# Patient Record
Sex: FEMALE | Race: WHITE | NOT HISPANIC OR LATINO | URBAN - METROPOLITAN AREA
[De-identification: names, ages, dates, MRNs, and addresses within clinical notes are randomized per-mention and may not be internally consistent; named-entity substitution may affect disease eponyms.]

---

## 2019-05-10 ENCOUNTER — EMERGENCY (EMERGENCY)
Facility: HOSPITAL | Age: 24
LOS: 1 days | Discharge: HOME | End: 2019-05-10
Admitting: PHYSICIAN ASSISTANT
Payer: COMMERCIAL

## 2019-05-10 VITALS
OXYGEN SATURATION: 100 % | RESPIRATION RATE: 16 BRPM | TEMPERATURE: 99 F | HEIGHT: 64 IN | WEIGHT: 139.99 LBS | DIASTOLIC BLOOD PRESSURE: 85 MMHG | SYSTOLIC BLOOD PRESSURE: 128 MMHG | HEART RATE: 105 BPM

## 2019-05-10 VITALS — RESPIRATION RATE: 18 BRPM | HEART RATE: 74 BPM | OXYGEN SATURATION: 100 %

## 2019-05-10 DIAGNOSIS — S61.211A LACERATION WITHOUT FOREIGN BODY OF LEFT INDEX FINGER WITHOUT DAMAGE TO NAIL, INITIAL ENCOUNTER: ICD-10-CM
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
## 2019-05-10 DIAGNOSIS — Z91.048 OTHER NONMEDICINAL SUBSTANCE ALLERGY STATUS: ICD-10-CM
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
## 2019-05-10 DIAGNOSIS — Z91.018 ALLERGY TO OTHER FOODS: ICD-10-CM
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
## 2019-05-10 PROCEDURE — 12001 RPR S/N/AX/GEN/TRNK 2.5CM/<: CPT

## 2019-05-10 PROCEDURE — 99282 EMERGENCY DEPT VISIT SF MDM: CPT | Mod: 25

## 2019-05-10 RX ORDER — ACETAMINOPHEN 500 MG
650 TABLET ORAL ONCE
Refills: 0 | Status: COMPLETED | OUTPATIENT
Start: 2019-05-10 | End: 2019-05-10

## 2019-05-10 RX ADMIN — Medication 650 MILLIGRAM(S): at 13:09

## 2019-05-10 NOTE — ED PROVIDER NOTE - NSFOLLOWUPINSTRUCTIONS_ED_ALL_ED_FT
Sutures to be removed in 10-14 days.  ________________________________________________________________________    Sutured Wound Care  Sutures are stitches that can be used to close wounds. Taking care of your wound properly can help to prevent pain and infection. It can also help your wound to heal more quickly. Follow instructions from your health care provider about how to care for your sutured wound.    Supplies needed:  Soap and water.  A clean bandage (dressing), if needed.  Antibiotic ointment.  A clean towel.  How to care for your sutured wound  Image   Keep the wound completely dry for the first 24 hours, or for as long as directed by your health care provider. After 24–48 hours, you may shower or bathe as directed by your health care provider. Do not soak or submerge the wound in water until the sutures have been removed.  After the first 24 hours, clean the wound once a day, or as often as directed by your health care provider, using the following steps:  Wash the wound with soap and water.  Rinse the wound with water to remove all soap.  Pat the wound dry with a clean towel. Do not rub the wound.  After cleaning the wound, apply a thin layer of antibiotic ointment as directed by your health care provider. This will prevent infection and keep the dressing from sticking to the wound.  Follow instructions from your health care provider about how to change your dressing:  Wash your hands with soap and water. If soap and water are not available, use hand .  Change your dressing at least once a day, or as often as told by your health care provider. If your dressing gets wet or dirty, change it.  Leave sutures and other skin closures, such as adhesive tape or skin glue, in place. These skin closures may need to stay in place for 2 weeks or longer. If adhesive strip edges start to loosen and curl up, you may trim the loose edges. Do not remove adhesive strips completely unless your health care provider tells you to do that.  Check your wound every day for signs of infection. Watch for:  Redness, swelling, or pain.  Fluid or blood.  Warmth.  Pus or a bad smell.  Have the sutures removed as directed by your health care provider.  Follow these instructions at home:  Medicines     Take or apply over-the-counter and prescription medicines only as told by your health care provider.  If you were prescribed an antibiotic medicine or ointment, take or apply it as told by your health care provider. Do not stop using the antibiotic even if your condition improves.  General instructions     To help reduce scarring after your wound heals, cover your wound with clothing or apply sunscreen of at least 30 SPF whenever you are outside.  Do not scratch or pick at your wound.  Avoid stretching your wound.  Raise (elevate) the injured area above the level of your heart while you are sitting or lying down, if possible.  Drink enough fluids to keep your urine clear or pale yellow.  Keep all follow-up visits as told by your health care provider. This is important.  Contact a health care provider if:  You received a tetanus shot and you have swelling, severe pain, redness, or bleeding at the injection site.  Your wound breaks open.  You have redness, swelling, or pain around your wound.  You have fluid or blood coming from your wound.  Your wound feels warm to the touch.  You have a fever.  You notice something coming out of your wound, such as wood or glass.  You have pain that does not get better with medicine.  The skin near your wound changes color.  You need to change your dressing very frequently due to a lot of fluid, blood, or pus draining from the wound.  You develop a new rash.  You develop numbness around the wound.  Get help right away if:  You develop severe swelling around your wound.  You have pus or a bad smell coming from your wound.  Your pain suddenly gets worse and is severe.  You develop painful lumps near your wound or anywhere on your body.  You have a red streak going away from your wound.  The wound is on your hand or foot and:  You cannot properly move a finger or toe.  Your fingers or toes look pale or bluish.  You have numbness that is spreading down your hand, foot, fingers, or toes.  Summary  Sutures are stitches that can be used to close wounds.  Taking care of your wound properly can help to prevent pain and infection.  Keep the wound completely dry for the first 24 hours, or for as long as directed by your health care provider. After 24–48 hours, you may shower or bathe as directed by your health care provider.  This information is not intended to replace advice given to you by your health care provider. Make sure you discuss any questions you have with your health care provider.

## 2019-05-10 NOTE — ED PROVIDER NOTE - OBJECTIVE STATEMENT
CARDIOVASCULAR - ADULT    • Maintains optimal cardiac output and hemodynamic stability Progressing    • Absence of cardiac arrhythmias or at baseline Progressing        Patient/Family Goals    • Patient/Family Long Term Goal Progressing    • Patient/Family
Problem: RESPIRATORY - ADULT  Goal: Achieves optimal ventilation and oxygenation  INTERVENTIONS:  - Assess for changes in respiratory status  - Assess for changes in mentation and behavior  - Position to facilitate oxygenation and minimize respiratory effo
Healthy 25 yo RHD fem c/o left index finger laceration.  Occurred while slicing bread 11 am today with clean sharp knife.  Bleeding controlled with direct pressure.  Last tetanus booster in 2015.      PMHx Guellain-Placitas syndrome 2019  PSHx oral surgery  Meds none  NKA

## 2019-05-10 NOTE — ED PROVIDER NOTE - NSFOLLOWUPCLINICS_GEN_ALL_ED_FT
[facility] - Emergency Department  Emergency Medicine  [address] [place] [address]  Phone: [phone]  Fax:   Follow Up Time: Routine

## 2019-05-10 NOTE — ED ADULT NURSE NOTE - OBJECTIVE STATEMENT
11:30 states she was cutting break and cut her  finger. lac to left pointer digit. denies any numbness/tingling to left upper extremity. states she is UTD with tetanus vaccine

## 2019-05-10 NOTE — ED ADULT NURSE NOTE - NSIMPLEMENTINTERV_GEN_ALL_ED
Implemented All Universal Safety Interventions:  Silver to call system. Call bell, personal items and telephone within reach. Instruct patient to call for assistance. Room bathroom lighting operational. Non-slip footwear when patient is off stretcher. Physically safe environment: no spills, clutter or unnecessary equipment. Stretcher in lowest position, wheels locked, appropriate side rails in place.

## 2019-05-10 NOTE — ED PROVIDER NOTE - CLINICAL SUMMARY MEDICAL DECISION MAKING FREE TEXT BOX
Clean laceration to distal phalanx left index finger.  No evidence of tendon injury or nerve injury.  No foreign body.  Tetanus UTD.  Simple sutured wound repair.  No indication for antibiotics.

## 2019-05-10 NOTE — ED PROVIDER NOTE - PHYSICAL EXAMINATION
GEN: appears uncomfortable, c/o pain left index finger  SKIN: normal color and turgor, no rash  MSK: clean, linear 1.5 cm laceration to radial aspect of left index finger distal phalanx up to lateral nailfold, but not including fingernail.  slight bleeding easily controlled with direct pressure. explored to base in bloodless field, no foreign body.  normal sensation to fingertip. normal flexor and extensor tendon function with 5/5 strength.  cap refill brisk to fingertip.